# Patient Record
Sex: FEMALE | Race: OTHER | HISPANIC OR LATINO
[De-identification: names, ages, dates, MRNs, and addresses within clinical notes are randomized per-mention and may not be internally consistent; named-entity substitution may affect disease eponyms.]

---

## 2022-02-07 PROBLEM — Z00.00 ENCOUNTER FOR PREVENTIVE HEALTH EXAMINATION: Status: ACTIVE | Noted: 2022-02-07

## 2022-02-11 ENCOUNTER — APPOINTMENT (OUTPATIENT)
Dept: PEDIATRICS | Facility: CLINIC | Age: 37
End: 2022-02-11
Payer: MEDICAID

## 2022-02-11 DIAGNOSIS — Z71.9 COUNSELING, UNSPECIFIED: ICD-10-CM

## 2022-02-11 PROCEDURE — 99202 OFFICE O/P NEW SF 15 MIN: CPT

## 2022-02-12 NOTE — DISCUSSION/SUMMARY
[FreeTextEntry1] : - SUGGESTED ALTERNATE VACCINE SCHEDULE \par - OFFICE HOURS DISCUSSED WITH PARENTS \par - VITAMIN K AND EYE DROPS DISCUSSED\par - ALLERGY TESTING AND REACTIONS DISCUSSED \par - ENCOURAGED TO TRY BREAST MILK. SUGGESTED SUPPLEMENTATION\par - SUGGESTED [facility] OR [facility] FOR ER\par - WILL F/U AFTER CHILD IS BORN

## 2022-02-12 NOTE — HISTORY OF PRESENT ILLNESS
[New - [facility] Care] : a new patient visit to establish care [FreeTextEntry6] : - FOUND OFFICE THROUGH [facility]\par - HAVING FIRST CHILD \par - CONCERNED ABOUT MEDICATIONS\par - UNCOMFORTABLE WITH SOME VACCINES: FLU, \par - MOM HAS QUESTIONS ABOUT GIVING VITAMIN K AND EYE DROPS AT BIRTH \par - FATHER HIGHLY ALLERGIC. WANTS TO KNOW WHEN TESTING CAN BE PERFORMED \par - MOM HAS NO INTEREST IN BREAST FEEDING -- CONCERNED SHE WON'T PRODUCE BREAST MILK AND DOESN'T WANT CHILD TO RELY ON BREAST\par - WONDERING WHICH ER TO GO TO IN CASE OF EMERGENCY